# Patient Record
Sex: MALE | Employment: FULL TIME | URBAN - METROPOLITAN AREA
[De-identification: names, ages, dates, MRNs, and addresses within clinical notes are randomized per-mention and may not be internally consistent; named-entity substitution may affect disease eponyms.]

---

## 2023-05-31 ENCOUNTER — OFFICE VISIT (OUTPATIENT)
Dept: ORTHOPEDIC SURGERY | Age: 23
End: 2023-05-31
Payer: COMMERCIAL

## 2023-05-31 DIAGNOSIS — S63.502A FOREARM SPRAIN, LEFT, INITIAL ENCOUNTER: Primary | ICD-10-CM

## 2023-05-31 PROCEDURE — 99203 OFFICE O/P NEW LOW 30 MIN: CPT | Performed by: ORTHOPAEDIC SURGERY

## 2023-05-31 NOTE — PROGRESS NOTES
[facility] Note    Name: Constantino Richard  YOB: 2000  Gender: male  MRN: [id number]      CC: Patient referred for evaluation of upper extremity pain    HPI: Constantino Richard is a 26 y.o. male Right hand dominant with a chief complaint of left forearm pain, he is a  and currently lifts anywhere between 25 and 50 pounds daily, he reports pain on the lateral aspect of the forearm, this has been going on for about a month, he does not remember any acute injury, denies any numbness or paresthesias. ROS/Meds/PSH/PMH/FH/SH: I personally reviewed the patients standard intake form. Pertinents are discussed in the HPI    Physical Examination:  General: Awake and alert. HEENT: Normocephalic, atraumatic  CV/Pulm: Breathing even and unlabored  Skin: No obvious rashes noted. Lymphatic: No obvious evidence of lymphedema or lymphadenopathy    Musculoskeletal Exam:  Examination on the left upper extremity demonstrates cap refill < 5 seconds in all fingers, no tenderness palpation on the elbow, no tense palpation of the wrist, he has full forearm and elbow motion, he has moderate tenderness palpation of the extensor supinator mass about 6 cm distal to the lateral epicondyle, this corresponds to the musculotendinous junction. Imaging / Electrodiagnostic Tests:     none    Assessment:   1. Forearm sprain, left, initial encounter        Plan:   We discussed the diagnosis and different treatment options. We discussed observation, therapy, antiinflammatory medications and other pertinent treatment modalities.     After discussing in detail the patient elects to proceed with Voltaren gel 3 times daily, over-the-counter anti-inflammatories as needed for pain, I advised the patient to rest the left arm is much as possible for the next 2 to 3 weeks and after that resume activities, more likely than not he has a musculotendinous junction injury of the extensor supinator mass, he will return in 4